# Patient Record
Sex: FEMALE | Race: WHITE | NOT HISPANIC OR LATINO | URBAN - METROPOLITAN AREA
[De-identification: names, ages, dates, MRNs, and addresses within clinical notes are randomized per-mention and may not be internally consistent; named-entity substitution may affect disease eponyms.]

---

## 2022-06-04 ENCOUNTER — TELEPHONE ENCOUNTER (OUTPATIENT)
Dept: URBAN - METROPOLITAN AREA CLINIC 68 | Facility: CLINIC | Age: 72
End: 2022-06-04

## 2022-06-04 RX ORDER — ONDANSETRON 4 MG/1
TABLET ORAL
Qty: 2 | Refills: 0 | OUTPATIENT
Start: 2015-10-01 | End: 2015-10-02

## 2022-06-04 RX ORDER — POLYETHYLENE GLYCOL 3350, SODIUM SULFATE, SODIUM CHLORIDE, POTASSIUM CHLORIDE, ASCORBIC ACID, SODIUM ASCORBATE 7.5-2.691G
KIT ORAL AS DIRECTED
Qty: 1 | Refills: 0 | OUTPATIENT
Start: 2015-09-29 | End: 2015-09-30

## 2022-06-05 ENCOUNTER — TELEPHONE ENCOUNTER (OUTPATIENT)
Dept: URBAN - METROPOLITAN AREA CLINIC 68 | Facility: CLINIC | Age: 72
End: 2022-06-05

## 2022-06-05 RX ORDER — DOXYLAMINE SUCCINATE 25 MG
UNISOM SLEEPGELS( 50MG ORAL 1/2 TAB AS NEEDED, FOR SLEEP ) ACTIVE -HX ENTRY TABLET ORAL
Status: ACTIVE | COMMUNITY
Start: 2015-09-22

## 2022-06-05 RX ORDER — ASPIRIN 81 MG/1
ASPIRIN EC( 81MG ORAL  DAILY ) ACTIVE -HX ENTRY TABLET ORAL DAILY
Status: ACTIVE | COMMUNITY
Start: 2015-09-22

## 2022-06-25 ENCOUNTER — TELEPHONE ENCOUNTER (OUTPATIENT)
Age: 72
End: 2022-06-25

## 2022-06-25 RX ORDER — ONDANSETRON HYDROCHLORIDE 4 MG/1
TABLET, FILM COATED ORAL
Qty: 2 | Refills: 0 | OUTPATIENT
Start: 2015-10-01 | End: 2015-10-02

## 2022-06-25 RX ORDER — POLYETHYLENE GLYCOL 3350, SODIUM SULFATE, SODIUM CHLORIDE, POTASSIUM CHLORIDE, ASCORBIC ACID, SODIUM ASCORBATE 7.5-2.691G
KIT ORAL AS DIRECTED
Qty: 1 | Refills: 0 | OUTPATIENT
Start: 2015-09-29 | End: 2015-09-30

## 2022-06-26 ENCOUNTER — TELEPHONE ENCOUNTER (OUTPATIENT)
Age: 72
End: 2022-06-26

## 2022-06-26 RX ORDER — ASPIRIN 81 MG
ASPIRIN EC( 81MG ORAL  DAILY ) ACTIVE -HX ENTRY TABLET, DELAYED RELEASE (ENTERIC COATED) ORAL DAILY
Status: ACTIVE | COMMUNITY
Start: 2015-09-22

## 2022-06-26 RX ORDER — DOXYLAMINE SUCCINATE 25 MG
UNISOM SLEEPGELS( 50MG ORAL 1/2 TAB AS NEEDED, FOR SLEEP ) ACTIVE -HX ENTRY TABLET ORAL
Status: ACTIVE | COMMUNITY
Start: 2015-09-22

## 2022-10-01 ENCOUNTER — TELEPHONE ENCOUNTER (OUTPATIENT)
Dept: URBAN - METROPOLITAN AREA CLINIC 68 | Facility: CLINIC | Age: 72
End: 2022-10-01

## 2022-10-01 RX ORDER — DOXYLAMINE SUCCINATE 25 MG
UNISOM SLEEPGELS( 50MG ORAL 1/2 TAB AS NEEDED, FOR SLEEP ) ACTIVE -HX ENTRY TABLET ORAL
Status: ACTIVE | COMMUNITY
Start: 2015-09-22

## 2022-10-01 RX ORDER — ASPIRIN 81 MG/1
ASPIRIN EC( 81MG ORAL  DAILY ) ACTIVE -HX ENTRY TABLET ORAL DAILY
Status: ACTIVE | COMMUNITY
Start: 2015-09-22

## 2022-10-01 RX ORDER — CEPHALEXIN 500 MG/1
1 CAPSULE CAPSULE ORAL
Qty: 20 | OUTPATIENT
Start: 2022-10-01 | End: 2022-10-06

## 2022-10-01 NOTE — EXAM-MIGRATED EXAMINATIONS
General Examination : Appearance of left pretibium consistent with cellulitis surrounding 3 leg ulcers (each superficial, without discharge and 2.5 cm in diameter).